# Patient Record
Sex: FEMALE | Race: WHITE | NOT HISPANIC OR LATINO | Employment: FULL TIME | URBAN - METROPOLITAN AREA
[De-identification: names, ages, dates, MRNs, and addresses within clinical notes are randomized per-mention and may not be internally consistent; named-entity substitution may affect disease eponyms.]

---

## 2021-08-06 ENCOUNTER — THERAPY VISIT (OUTPATIENT)
Dept: PHYSICAL THERAPY | Facility: CLINIC | Age: 61
End: 2021-08-06
Payer: COMMERCIAL

## 2021-08-06 DIAGNOSIS — Z98.890 S/P ROTATOR CUFF SURGERY: ICD-10-CM

## 2021-08-06 DIAGNOSIS — G89.18 ACUTE POSTOPERATIVE PAIN OF RIGHT SHOULDER: Primary | ICD-10-CM

## 2021-08-06 DIAGNOSIS — M25.511 ACUTE POSTOPERATIVE PAIN OF RIGHT SHOULDER: Primary | ICD-10-CM

## 2021-08-06 PROCEDURE — 97110 THERAPEUTIC EXERCISES: CPT | Mod: GP | Performed by: PHYSICAL THERAPIST

## 2021-08-06 PROCEDURE — 97161 PT EVAL LOW COMPLEX 20 MIN: CPT | Mod: GP | Performed by: PHYSICAL THERAPIST

## 2021-08-09 NOTE — PROGRESS NOTES
"Physical Therapy Initial Evaluation  Subjective:  The history is provided by the patient. No  was used.   Therapist Generated HPI Evaluation  Problem details: \"Jazmin\" is a 62 yo female referred to physical therapy post op R RCR ( supra and subscap), SAD, extensive debridement, and sub pec biceps tenodesis under the direction of Dr. Nelson 7/19/2021.     Patient is R hand dominant.     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     She is fairly sore post op yet. Manages pain with medication and ice .    She is wearing full sling immobilizer with abductor cushion x 4 weeks, then 2 additional with sling only.     .         Type of problem:  Right shoulder.    This is a new condition.  Condition occurred with:  Other (post op ).  Where condition occurred: for unknown reasons.  Patient reports pain:  In the joint, anterior, lateral, posterior and upper arm.  Pain is described as aching (5/10 ) and is intermittent.  Pain radiates to:  Upper arm. Pain is the same all the time.  Since onset symptoms are gradually improving.  Associated symptoms:  Loss of motion/stiffness and loss of strength. Exacerbated by: limited to table top only movement, no greater than coffee cup lift.   and relieved by ice and other.  Special tests included:  X-ray and MRI.    Restrictions due to condition include:  Currently not working due to present treatment.  Barriers include:  None as reported by patient.                        Objective:  Standing Alignment:    Cervical/Thoracic:  Forward head  Shoulder/UE:  Rounded shoulders              Gait:    Gait Type:  Normal   Weight Bearing Status:  WBAT   Assistive Devices:  None      Flexibility/Screens:   Negative screens: Cervical           Neurological: She is alert. She has normal strength and normal reflexes. No cranial nerve deficit or sensory " deficit.                      Shoulder Evaluation:  ROM:    PROM:    Flexion:  Left:  170    Right: 40      Abduction:  Right:  30    Internal Rotation:  Left:  90    Right:  30  External Rotation:  Left:  90    Right:  0, able to reach neutral , did not proceed due to post op precautions due to subscap repair                     Strength:  not assessed                      Stability Testing:  not assessed      Special Tests:  not assessed      Palpation:  Palpation assessed shoulder: incisions are clean and really healing well.                                          General     ROS    Assessment/Plan:    Patient is a 61 year old female with right side shoulder complaints.    Patient has the following significant findings with corresponding treatment plan.                Diagnosis 1:   R RCR ( supra and subscap), SAD, extensive debridement, and sub pec biceps tenodesis under the direction of Dr. Nelson 7/19/2021.         Therapy Evaluation Codes:   1) History comprised of:   Personal factors that impact the plan of care:      None.    Comorbidity factors that impact the plan of care are:      Cancer, Diabetes, High blood pressure and Overweight.     Medications impacting care: High blood pressure, Pain and allergy to sulfa meds .  2) Examination of Body Systems comprised of:   Body structures and functions that impact the plan of care:      Shoulder.   Activity limitations that impact the plan of care are:      Cooking, Driving, Dressing, Lifting, Working, Sleeping and Laying down.  3) Clinical presentation characteristics are:   Stable/Uncomplicated.  4) Decision-Making    Low complexity using standardized patient assessment instrument and/or measureable assessment of functional outcome.  Cumulative Therapy Evaluation is: Low complexity.    Previous and current functional limitations:  (See Goal Flow Sheet for this information)    Short term and Long term goals: (See Goal Flow Sheet for this information)      Communication ability:  Patient appears to be able to clearly communicate and understand verbal and written communication and follow directions correctly.  Treatment Explanation - The following has been discussed with the patient:   RX ordered/plan of care  Anticipated outcomes  Possible risks and side effects  This patient would benefit from PT intervention to resume normal activities.   Rehab potential is good.    Frequency:  1 X week, once daily  Duration:  for 6 weeks, then 1x/week , every other week x 12 weeks   Discharge Plan:  Independent in home treatment program.  Reach maximal therapeutic benefit.    Rehabilitation Plans: SAOS post op RC Repair with appropriate precautions based on surgery, OK to begin early PROM by MD order.     Please refer to the daily flowsheet for treatment today, total treatment time and time spent performing 1:1 timed codes.

## 2021-08-11 ENCOUNTER — THERAPY VISIT (OUTPATIENT)
Dept: PHYSICAL THERAPY | Facility: CLINIC | Age: 61
End: 2021-08-11
Payer: COMMERCIAL

## 2021-08-11 DIAGNOSIS — G89.18 ACUTE POSTOPERATIVE PAIN OF RIGHT SHOULDER: Primary | ICD-10-CM

## 2021-08-11 DIAGNOSIS — M25.511 ACUTE POSTOPERATIVE PAIN OF RIGHT SHOULDER: Primary | ICD-10-CM

## 2021-08-11 PROCEDURE — 97110 THERAPEUTIC EXERCISES: CPT | Mod: GP | Performed by: PHYSICAL THERAPIST

## 2021-08-11 PROCEDURE — 97014 ELECTRIC STIMULATION THERAPY: CPT | Performed by: PHYSICAL THERAPIST

## 2021-08-13 ENCOUNTER — THERAPY VISIT (OUTPATIENT)
Dept: PHYSICAL THERAPY | Facility: CLINIC | Age: 61
End: 2021-08-13
Payer: COMMERCIAL

## 2021-08-13 DIAGNOSIS — G89.18 ACUTE POSTOPERATIVE PAIN OF RIGHT SHOULDER: Primary | ICD-10-CM

## 2021-08-13 DIAGNOSIS — Z98.890 S/P ROTATOR CUFF SURGERY: ICD-10-CM

## 2021-08-13 DIAGNOSIS — M25.511 ACUTE POSTOPERATIVE PAIN OF RIGHT SHOULDER: Primary | ICD-10-CM

## 2021-08-13 PROCEDURE — 97110 THERAPEUTIC EXERCISES: CPT | Mod: GP

## 2021-08-16 ENCOUNTER — THERAPY VISIT (OUTPATIENT)
Dept: PHYSICAL THERAPY | Facility: CLINIC | Age: 61
End: 2021-08-16
Payer: COMMERCIAL

## 2021-08-16 DIAGNOSIS — Z98.890 S/P ROTATOR CUFF SURGERY: Primary | ICD-10-CM

## 2021-08-16 PROCEDURE — 97010 HOT OR COLD PACKS THERAPY: CPT | Mod: GP | Performed by: PHYSICAL THERAPIST

## 2021-08-16 PROCEDURE — 97110 THERAPEUTIC EXERCISES: CPT | Mod: GP | Performed by: PHYSICAL THERAPIST

## 2021-08-18 ENCOUNTER — THERAPY VISIT (OUTPATIENT)
Dept: PHYSICAL THERAPY | Facility: CLINIC | Age: 61
End: 2021-08-18
Payer: COMMERCIAL

## 2021-08-18 DIAGNOSIS — Z98.890 S/P ROTATOR CUFF SURGERY: Primary | ICD-10-CM

## 2021-08-18 PROCEDURE — 97010 HOT OR COLD PACKS THERAPY: CPT | Mod: GP | Performed by: PHYSICAL THERAPIST

## 2021-08-18 PROCEDURE — 97110 THERAPEUTIC EXERCISES: CPT | Mod: GP | Performed by: PHYSICAL THERAPIST

## 2021-08-24 ENCOUNTER — THERAPY VISIT (OUTPATIENT)
Dept: PHYSICAL THERAPY | Facility: CLINIC | Age: 61
End: 2021-08-24
Payer: COMMERCIAL

## 2021-08-24 DIAGNOSIS — Z98.890 S/P ROTATOR CUFF SURGERY: ICD-10-CM

## 2021-08-24 DIAGNOSIS — M25.511 RIGHT SHOULDER PAIN: Primary | ICD-10-CM

## 2021-08-24 PROCEDURE — 97110 THERAPEUTIC EXERCISES: CPT | Mod: GP | Performed by: PHYSICAL THERAPIST

## 2021-08-26 ENCOUNTER — THERAPY VISIT (OUTPATIENT)
Dept: PHYSICAL THERAPY | Facility: CLINIC | Age: 61
End: 2021-08-26
Payer: COMMERCIAL

## 2021-08-26 DIAGNOSIS — M25.511 ACUTE PAIN OF RIGHT SHOULDER: Primary | ICD-10-CM

## 2021-08-26 DIAGNOSIS — Z98.890 S/P ROTATOR CUFF SURGERY: ICD-10-CM

## 2021-08-26 PROCEDURE — 97110 THERAPEUTIC EXERCISES: CPT | Mod: GP | Performed by: PHYSICAL THERAPIST

## 2021-08-30 ENCOUNTER — THERAPY VISIT (OUTPATIENT)
Dept: PHYSICAL THERAPY | Facility: CLINIC | Age: 61
End: 2021-08-30
Payer: COMMERCIAL

## 2021-08-30 DIAGNOSIS — Z98.890 S/P ROTATOR CUFF SURGERY: Primary | ICD-10-CM

## 2021-08-30 PROCEDURE — 97010 HOT OR COLD PACKS THERAPY: CPT | Mod: GP | Performed by: PHYSICAL THERAPIST

## 2021-08-30 PROCEDURE — 97110 THERAPEUTIC EXERCISES: CPT | Mod: GP | Performed by: PHYSICAL THERAPIST

## 2021-09-08 ENCOUNTER — THERAPY VISIT (OUTPATIENT)
Dept: PHYSICAL THERAPY | Facility: CLINIC | Age: 61
End: 2021-09-08
Payer: COMMERCIAL

## 2021-09-08 DIAGNOSIS — Z98.890 S/P ROTATOR CUFF SURGERY: ICD-10-CM

## 2021-09-08 DIAGNOSIS — M25.511 ACUTE PAIN OF RIGHT SHOULDER: Primary | ICD-10-CM

## 2021-09-08 PROCEDURE — 97110 THERAPEUTIC EXERCISES: CPT | Mod: GP | Performed by: PHYSICAL THERAPIST

## 2021-09-13 ENCOUNTER — THERAPY VISIT (OUTPATIENT)
Dept: PHYSICAL THERAPY | Facility: CLINIC | Age: 61
End: 2021-09-13
Payer: COMMERCIAL

## 2021-09-13 DIAGNOSIS — M25.511 ACUTE PAIN OF RIGHT SHOULDER: ICD-10-CM

## 2021-09-13 DIAGNOSIS — Z98.890 S/P ROTATOR CUFF SURGERY: Primary | ICD-10-CM

## 2021-09-13 PROCEDURE — 97110 THERAPEUTIC EXERCISES: CPT | Mod: GP | Performed by: PHYSICAL THERAPIST

## 2021-09-16 ENCOUNTER — THERAPY VISIT (OUTPATIENT)
Dept: PHYSICAL THERAPY | Facility: CLINIC | Age: 61
End: 2021-09-16
Payer: COMMERCIAL

## 2021-09-16 DIAGNOSIS — M25.511 ACUTE PAIN OF RIGHT SHOULDER: ICD-10-CM

## 2021-09-16 DIAGNOSIS — Z98.890 S/P ROTATOR CUFF SURGERY: Primary | ICD-10-CM

## 2021-09-16 PROCEDURE — 97110 THERAPEUTIC EXERCISES: CPT | Mod: GP | Performed by: PHYSICAL THERAPIST

## 2021-09-20 ENCOUNTER — THERAPY VISIT (OUTPATIENT)
Dept: PHYSICAL THERAPY | Facility: CLINIC | Age: 61
End: 2021-09-20
Payer: COMMERCIAL

## 2021-09-20 DIAGNOSIS — M25.511 RIGHT SHOULDER PAIN: ICD-10-CM

## 2021-09-20 DIAGNOSIS — Z98.890 S/P ROTATOR CUFF SURGERY: Primary | ICD-10-CM

## 2021-09-20 PROCEDURE — 97110 THERAPEUTIC EXERCISES: CPT | Mod: GP | Performed by: PHYSICAL THERAPIST

## 2021-09-23 ENCOUNTER — THERAPY VISIT (OUTPATIENT)
Dept: PHYSICAL THERAPY | Facility: CLINIC | Age: 61
End: 2021-09-23
Payer: COMMERCIAL

## 2021-09-23 DIAGNOSIS — M25.511 RIGHT SHOULDER PAIN: ICD-10-CM

## 2021-09-23 DIAGNOSIS — Z98.890 S/P ROTATOR CUFF SURGERY: Primary | ICD-10-CM

## 2021-09-23 PROCEDURE — 97110 THERAPEUTIC EXERCISES: CPT | Mod: GP | Performed by: PHYSICAL THERAPIST

## 2021-09-27 ENCOUNTER — THERAPY VISIT (OUTPATIENT)
Dept: PHYSICAL THERAPY | Facility: CLINIC | Age: 61
End: 2021-09-27
Payer: COMMERCIAL

## 2021-09-27 DIAGNOSIS — M25.511 RIGHT SHOULDER PAIN: ICD-10-CM

## 2021-09-27 DIAGNOSIS — Z98.890 S/P ROTATOR CUFF SURGERY: Primary | ICD-10-CM

## 2021-09-27 PROCEDURE — 97110 THERAPEUTIC EXERCISES: CPT | Mod: GP | Performed by: PHYSICAL THERAPIST

## 2021-09-30 ENCOUNTER — THERAPY VISIT (OUTPATIENT)
Dept: PHYSICAL THERAPY | Facility: CLINIC | Age: 61
End: 2021-09-30
Payer: COMMERCIAL

## 2021-09-30 DIAGNOSIS — Z98.890 S/P ROTATOR CUFF SURGERY: Primary | ICD-10-CM

## 2021-09-30 DIAGNOSIS — M25.511 RIGHT SHOULDER PAIN: ICD-10-CM

## 2021-09-30 PROCEDURE — 97110 THERAPEUTIC EXERCISES: CPT | Mod: GP | Performed by: PHYSICAL THERAPIST

## 2021-10-12 ENCOUNTER — THERAPY VISIT (OUTPATIENT)
Dept: PHYSICAL THERAPY | Facility: CLINIC | Age: 61
End: 2021-10-12
Payer: COMMERCIAL

## 2021-10-12 DIAGNOSIS — Z98.890 S/P ROTATOR CUFF SURGERY: Primary | ICD-10-CM

## 2021-10-12 DIAGNOSIS — M25.511 RIGHT SHOULDER PAIN: ICD-10-CM

## 2021-10-12 PROCEDURE — 97110 THERAPEUTIC EXERCISES: CPT | Mod: GP | Performed by: PHYSICAL THERAPIST

## 2021-10-12 NOTE — PROGRESS NOTES
"Subjective:      Physical Exam                    Objective:  System    Physical Exam    General     ROS    Assessment/Plan:    PROGRESS  REPORT    Progress reporting period is from 8/2/2021 to 10/11/2021. 12 weeks post op R shoudler Subscap Repair, SAD    SUBJECTIVE  Jazmin feels gradually improved each month. Last week, due to soreness, she had been advised to a few days off the rehab visit and HEP has really helped reduce shoudler pain and achiness. Intrermittant \"catching\" R shoulder .        Current Pain level: 1/10   Initial Pain level: 7/10     Changes in function: Yes, see goal flow sheet for change in function   Adverse reactions: None;   ,         OBJECTIVE   12 weeks post op R RCR.     AROM 130-135 , ER 45, IR 85      HEP: gentle ROM daily, supine AROM strength 3x/week.     Gradually gaining 5-10 degrees/week as planned with goal of full ROM 4 months post op..     PT planned weekly for the next 3-4 weeks, then recommend every other week at that point unless directed by MD aj.     ASSESSMENT/PLAN  Updated problem list and treatment plan: Diagnosis 1:  R RCR, Subscap         STG/LTGs have been met or progress has been made towards goals:  Yes (See Goal flow sheet completed today.)  Assessment of Progress: The patient's condition is improving.  The patient's condition has potential to improve.  Self Management Plans:  Patient has been instructed in a home treatment program.  I have re-evaluated this patient and find that the nature, scope, duration and intensity of the therapy is appropriate for the medical condition of the patient.    Recommendations:  This patient would benefit from continued therapy.     Frequency:  1 X week, once daily  Duration:  for 4 weeks tapering to 1 X a week, every other week  over 8 weeks        Please refer to the daily flowsheet for treatment today, total treatment time and time spent performing 1:1 timed codes.    "

## 2021-10-19 ENCOUNTER — THERAPY VISIT (OUTPATIENT)
Dept: PHYSICAL THERAPY | Facility: CLINIC | Age: 61
End: 2021-10-19
Payer: COMMERCIAL

## 2021-10-19 DIAGNOSIS — Z98.890 S/P ROTATOR CUFF SURGERY: Primary | ICD-10-CM

## 2021-10-19 DIAGNOSIS — M25.511 RIGHT SHOULDER PAIN: ICD-10-CM

## 2021-10-19 PROCEDURE — 97110 THERAPEUTIC EXERCISES: CPT | Mod: GP | Performed by: PHYSICAL THERAPIST

## 2021-10-26 ENCOUNTER — THERAPY VISIT (OUTPATIENT)
Dept: PHYSICAL THERAPY | Facility: CLINIC | Age: 61
End: 2021-10-26
Payer: COMMERCIAL

## 2021-10-26 DIAGNOSIS — Z98.890 S/P ROTATOR CUFF SURGERY: Primary | ICD-10-CM

## 2021-10-26 DIAGNOSIS — M25.511 RIGHT SHOULDER PAIN: ICD-10-CM

## 2021-10-26 PROCEDURE — 97110 THERAPEUTIC EXERCISES: CPT | Mod: GP | Performed by: PHYSICAL THERAPIST

## 2021-11-02 ENCOUNTER — THERAPY VISIT (OUTPATIENT)
Dept: PHYSICAL THERAPY | Facility: CLINIC | Age: 61
End: 2021-11-02
Payer: COMMERCIAL

## 2021-11-02 DIAGNOSIS — Z98.890 S/P ROTATOR CUFF SURGERY: Primary | ICD-10-CM

## 2021-11-02 DIAGNOSIS — M25.511 RIGHT SHOULDER PAIN: ICD-10-CM

## 2021-11-02 PROCEDURE — 97110 THERAPEUTIC EXERCISES: CPT | Mod: GP | Performed by: PHYSICAL THERAPIST

## 2021-11-09 ENCOUNTER — THERAPY VISIT (OUTPATIENT)
Dept: PHYSICAL THERAPY | Facility: CLINIC | Age: 61
End: 2021-11-09
Payer: COMMERCIAL

## 2021-11-09 DIAGNOSIS — M25.511 RIGHT SHOULDER PAIN: ICD-10-CM

## 2021-11-09 DIAGNOSIS — Z98.890 S/P ROTATOR CUFF SURGERY: Primary | ICD-10-CM

## 2021-11-09 PROCEDURE — 97110 THERAPEUTIC EXERCISES: CPT | Mod: GP | Performed by: PHYSICAL THERAPIST

## 2021-11-09 PROCEDURE — 97112 NEUROMUSCULAR REEDUCATION: CPT | Mod: GP | Performed by: PHYSICAL THERAPIST

## 2021-11-16 ENCOUNTER — THERAPY VISIT (OUTPATIENT)
Dept: PHYSICAL THERAPY | Facility: CLINIC | Age: 61
End: 2021-11-16
Payer: COMMERCIAL

## 2021-11-16 DIAGNOSIS — Z98.890 S/P ROTATOR CUFF SURGERY: Primary | ICD-10-CM

## 2021-11-16 DIAGNOSIS — M25.511 RIGHT SHOULDER PAIN: ICD-10-CM

## 2021-11-16 PROCEDURE — 97140 MANUAL THERAPY 1/> REGIONS: CPT | Mod: GP | Performed by: PHYSICAL THERAPIST

## 2021-11-16 PROCEDURE — 97110 THERAPEUTIC EXERCISES: CPT | Mod: GP | Performed by: PHYSICAL THERAPIST

## 2021-11-22 ENCOUNTER — THERAPY VISIT (OUTPATIENT)
Dept: PHYSICAL THERAPY | Facility: CLINIC | Age: 61
End: 2021-11-22
Payer: COMMERCIAL

## 2021-11-22 DIAGNOSIS — Z98.890 S/P ROTATOR CUFF SURGERY: Primary | ICD-10-CM

## 2021-11-22 DIAGNOSIS — M25.511 RIGHT SHOULDER PAIN: ICD-10-CM

## 2021-11-22 PROCEDURE — 97110 THERAPEUTIC EXERCISES: CPT | Mod: GP | Performed by: PHYSICAL THERAPIST

## 2021-11-22 PROCEDURE — 97112 NEUROMUSCULAR REEDUCATION: CPT | Mod: GP | Performed by: PHYSICAL THERAPIST

## 2021-11-30 ENCOUNTER — THERAPY VISIT (OUTPATIENT)
Dept: PHYSICAL THERAPY | Facility: CLINIC | Age: 61
End: 2021-11-30
Payer: COMMERCIAL

## 2021-11-30 DIAGNOSIS — Z98.890 S/P ROTATOR CUFF SURGERY: Primary | ICD-10-CM

## 2021-11-30 DIAGNOSIS — M25.511 RIGHT SHOULDER PAIN: ICD-10-CM

## 2021-11-30 PROCEDURE — 97110 THERAPEUTIC EXERCISES: CPT | Mod: GP | Performed by: PHYSICAL THERAPIST

## 2021-11-30 PROCEDURE — 97112 NEUROMUSCULAR REEDUCATION: CPT | Mod: GP | Performed by: PHYSICAL THERAPIST

## 2021-12-09 ENCOUNTER — THERAPY VISIT (OUTPATIENT)
Dept: PHYSICAL THERAPY | Facility: CLINIC | Age: 61
End: 2021-12-09
Payer: COMMERCIAL

## 2021-12-09 DIAGNOSIS — G89.18 ACUTE POSTOPERATIVE PAIN OF RIGHT SHOULDER: ICD-10-CM

## 2021-12-09 DIAGNOSIS — M25.511 ACUTE POSTOPERATIVE PAIN OF RIGHT SHOULDER: ICD-10-CM

## 2021-12-09 DIAGNOSIS — Z98.890 S/P ROTATOR CUFF SURGERY: Primary | ICD-10-CM

## 2021-12-09 PROCEDURE — 97110 THERAPEUTIC EXERCISES: CPT | Mod: GP | Performed by: PHYSICAL THERAPIST

## 2021-12-09 PROCEDURE — 97140 MANUAL THERAPY 1/> REGIONS: CPT | Mod: GP | Performed by: PHYSICAL THERAPIST

## 2021-12-14 ENCOUNTER — THERAPY VISIT (OUTPATIENT)
Dept: PHYSICAL THERAPY | Facility: CLINIC | Age: 61
End: 2021-12-14
Payer: COMMERCIAL

## 2021-12-14 DIAGNOSIS — M25.511 ACUTE POSTOPERATIVE PAIN OF RIGHT SHOULDER: ICD-10-CM

## 2021-12-14 DIAGNOSIS — G89.18 ACUTE POSTOPERATIVE PAIN OF RIGHT SHOULDER: ICD-10-CM

## 2021-12-14 DIAGNOSIS — Z98.890 S/P ROTATOR CUFF SURGERY: Primary | ICD-10-CM

## 2021-12-14 PROCEDURE — 97140 MANUAL THERAPY 1/> REGIONS: CPT | Mod: GP | Performed by: PHYSICAL THERAPIST

## 2021-12-14 PROCEDURE — 97110 THERAPEUTIC EXERCISES: CPT | Mod: GP | Performed by: PHYSICAL THERAPIST

## 2021-12-14 NOTE — PROGRESS NOTES
Subjective:  HPI  Physical Exam                    Objective:  System    Physical Exam    General     ROS    Assessment/Plan:    PROGRESS  REPORT      SUBJECTIVE  Patient is improving post op ROM, pain, and function.     She is meeting up with her MD next week for consult and potential RTW conversation.      Plan to transition to self care at that point.      SPADI 27/100.     Patient notes of 60-70% recovered compared to the oposite side.     She notes that she is ready to RTW at a light duty capacity.      Most functional limits are lifting greater than 10#, reaching terminally overhead and behind her back.       Current Pain level: 1/10   Initial Pain level: 7/10   Changes in function: Yes, see goal flow sheet for change in function   Adverse reactions: None;   ,         OBJECTIVE  R shoulder ROM      ER 70 vs 80   IR 90 vs 90    Flexion 160 vs 170   posterior reach L5. Vs T10    Plan strength and function test next visit along with PN to MD to assist in RTW decision making.       ASSESSMENT/PLAN  Updated problem list and treatment plan: Diagnosis 1:  Post op R RCR , subscap       STG/LTGs have been met or progress has been made towards goals:  Yes (See Goal flow sheet completed today.)  Assessment of Progress: The patient's condition is improving.  The patient's condition has potential to improve.  Self Management Plans:  Patient has been instructed in a home treatment program.      Recommendations:  This patient would benefit from further evaluation.    1 additional visit prior to next MD consult.     Please refer to the daily flowsheet for treatment today, total treatment time and time spent performing 1:1 timed codes.

## 2021-12-21 ENCOUNTER — THERAPY VISIT (OUTPATIENT)
Dept: PHYSICAL THERAPY | Facility: CLINIC | Age: 61
End: 2021-12-21
Payer: COMMERCIAL

## 2021-12-21 DIAGNOSIS — M25.511 ACUTE POSTOPERATIVE PAIN OF RIGHT SHOULDER: ICD-10-CM

## 2021-12-21 DIAGNOSIS — M25.511 ACUTE PAIN OF RIGHT SHOULDER: ICD-10-CM

## 2021-12-21 DIAGNOSIS — G89.18 ACUTE POSTOPERATIVE PAIN OF RIGHT SHOULDER: ICD-10-CM

## 2021-12-21 DIAGNOSIS — Z98.890 S/P ROTATOR CUFF SURGERY: Primary | ICD-10-CM

## 2021-12-21 PROCEDURE — 97110 THERAPEUTIC EXERCISES: CPT | Mod: GP | Performed by: PHYSICAL THERAPIST

## 2021-12-21 PROCEDURE — 97530 THERAPEUTIC ACTIVITIES: CPT | Mod: GP | Performed by: PHYSICAL THERAPIST

## 2021-12-21 PROCEDURE — 97112 NEUROMUSCULAR REEDUCATION: CPT | Mod: GP | Performed by: PHYSICAL THERAPIST

## 2021-12-21 NOTE — PROGRESS NOTES
Subjective:  The history is provided by the patient.     Physical Exam                    Objective:  System    Physical Exam    General     ROS    Assessment/Plan:    PROGRESS  REPORT    Progress reporting period is from 8/6/2021 to 12/21/2021.     SUBJECTIVE   Jazmin is 5 months post op massive  R RCR .    Gradually gaining strength, mobility and function. Pain is lessening with time as well.      SPADI 20/100.     Patient is scheduled RTW with limitations based on MD consult today.        Current Pain level: 1/10   Initial Pain level: 7/10     OBJECTIVE   R Shoudler ROM   flexion 140  vs 170   ER 60  vs 85     IR 90 vs 90   posterior reach T12 vs T7.     Global RTC endurance strength is roughly 40% of the opposite side.     Functional lifting recommendation   floor to waist 10#    waist to shoulder 5-8#    shoulder or above 0-1#    outward reach  0-2#    (Based on 50% 1 RM lift test)    HEP well established with daily gentle ROM. Strengthening 2-3x/week with light weight, high reps, RTC and scap stability          Recommedn PT follow up 2-4 weeks for 2-4 visits.       ASSESSMENT/PLAN  Updated problem list and treatment plan: Diagnosis 1:  Post op R Massive RCR (subscap)       STG/LTGs have been met or progress has been made towards goals:  Yes (See Goal flow sheet completed today.)  Assessment of Progress: The patient's condition is improving.  The patient's condition has potential to improve.  Self Management Plans:  Patient has been instructed in a home treatment program.    Recommendations:  This patient would benefit from continued therapy.     Frequency:  1 X a month, once daily  Duration:  for 3 months    Follow up with MD today for consult.       Please refer to the daily flowsheet for treatment today, total treatment time and time spent performing 1:1 timed codes.

## 2021-12-21 NOTE — LETTER
[facility]  [address]  [place] [address]  [phone]    2021    Re: Jazmin Vergara   :   1960  MRN:  [id number]   REFERRING PHYSICIAN:   Kinga L[facility]    Date of Initial Evaluation:  21  Visits:  Rxs Used:   Reason for Referral:     S/P rotator cuff surgery  Acute postoperative pain of right shoulder  Acute pain of right shoulder    PROGRESS  REPORT  Progress reporting period is from 2021 to 2021.     SUBJECTIVE  Jazmin is 5 months post op massive  R RCR .  Gradually gaining strength, mobility and function. Pain is lessening with time as well.   SPADI 20/100.  Patient is scheduled RTW with limitations based on MD consult today.     Current Pain level: 1/10   Initial Pain level: 7/10     OBJECTIVE   R Shoudler ROM   flexion 140  vs 170   ER 60  vs 85     IR 90 vs 90   posterior reach T12 vs T7.   Global RTC endurance strength is roughly 40% of the opposite side.  Functional lifting recommendation  floor to waist 10#    waist to shoulder 5-8#    shoulder or above 0-1#   outward reach  0-2#  (Based on 50% 1 RM lift test)    HEP well established with daily gentle ROM. Strengthening 2-3x/week with light weight, high reps, RTC and scap stability     Recommedn PT follow up 2-4 weeks for 2-4 visits.       ASSESSMENT/PLAN  Updated problem list and treatment plan: Diagnosis 1:  Post op R Massive RCR (subscap)     STG/LTGs have been met or progress has been made towards goals:  Yes (See Goal flow sheet completed today.)  Assessment of Progress: The patient's condition is improving.  The patient's condition has potential to improve.  Self Management Plans:  Patient has been instructed in a home treatment program.    Recommendations:  This patient would benefit from continued therapy.     Frequency:  1 X a month, once daily  Duration:  for 3 months    Follow up with MD today for  consult.     Thank you for your referral.    INQUIRIES  Therapist: Nicholas Figueroa, PT, ATC, Cert. MDT  [facility] [place] [facility]  [address]  [place] [address]  Phone: [phone]  Fax: [phone]

## 2021-12-25 ENCOUNTER — HEALTH MAINTENANCE LETTER (OUTPATIENT)
Age: 61
End: 2021-12-25

## 2022-01-13 ENCOUNTER — THERAPY VISIT (OUTPATIENT)
Dept: PHYSICAL THERAPY | Facility: CLINIC | Age: 62
End: 2022-01-13
Payer: COMMERCIAL

## 2022-01-13 DIAGNOSIS — M25.511 ACUTE POSTOPERATIVE PAIN OF RIGHT SHOULDER: ICD-10-CM

## 2022-01-13 DIAGNOSIS — M25.511 ACUTE PAIN OF RIGHT SHOULDER: ICD-10-CM

## 2022-01-13 DIAGNOSIS — Z98.890 S/P ROTATOR CUFF SURGERY: Primary | ICD-10-CM

## 2022-01-13 DIAGNOSIS — G89.18 ACUTE POSTOPERATIVE PAIN OF RIGHT SHOULDER: ICD-10-CM

## 2022-01-13 PROCEDURE — 97110 THERAPEUTIC EXERCISES: CPT | Mod: GP | Performed by: PHYSICAL THERAPIST

## 2022-01-24 NOTE — PROGRESS NOTES
Subjective:  HPI  Physical Exam                    Objective:  System    Physical Exam    General     ROS    Assessment/Plan:    DISCHARGE REPORT    Progress reporting period is from 8/6/2021 to 1/13/2022.     SUBJECTIVE   Jazmin returns to PT after consult with MD roughly 3 weeks ago.     She has been directed back to work with restriction. She is gradually advancing function and happy with outcome to this point.     5 1/2 months post op.     Jazmin feels that she is able to self manage and seems to be improving function, ROM and strength with return to work duty.   She does have limitations with lifting greater than 20#, reaching behind her back yet that most likely continue to restore function, ROM and strength up to 1 year post op.      Current Pain level: 0-1/10, intermittent    Initial Pain level: 7/10   Changes in function: Yes, see goal flow sheet for change in function   Adverse reactions: None;   ,         OBJECTIVE   Functional Testing 1 repetition maximum      15# floor to waist    waist to shoulder 5#   nothing overhead .     ROM : 80 ER, 90 IR , 150  flexion       No painful arc. Good scap control with reaching.     Overall shoulder strength restored to roughly 60-70% of the opposite     ASSESSMENT/PLAN  Updated problem list and treatment plan: Diagnosis 1:  S/p R RCR       STG/LTGs have been met or progress has been made towards goals:  Yes (See Goal flow sheet completed today.)  Assessment of Progress: The patient's condition is improving.  The patient's condition has potential to improve.  Self Management Plans:  Patient has been instructed in a home treatment program.      Recommendations:  This patient is ready to be discharged from therapy and continue their home treatment program.  This patient would benefit from further evaluation.    Please refer to the daily flowsheet for treatment today, total treatment time and time spent performing 1:1 timed codes.

## 2022-02-11 ENCOUNTER — THERAPY VISIT (OUTPATIENT)
Dept: PHYSICAL THERAPY | Facility: CLINIC | Age: 62
End: 2022-02-11
Payer: COMMERCIAL

## 2022-02-11 DIAGNOSIS — M25.511 ACUTE POSTOPERATIVE PAIN OF RIGHT SHOULDER: ICD-10-CM

## 2022-02-11 DIAGNOSIS — Z98.890 S/P ROTATOR CUFF SURGERY: Primary | ICD-10-CM

## 2022-02-11 DIAGNOSIS — G89.18 ACUTE POSTOPERATIVE PAIN OF RIGHT SHOULDER: ICD-10-CM

## 2022-02-11 PROCEDURE — 97110 THERAPEUTIC EXERCISES: CPT | Mod: GP | Performed by: PHYSICAL THERAPIST

## 2022-03-04 ENCOUNTER — THERAPY VISIT (OUTPATIENT)
Dept: PHYSICAL THERAPY | Facility: CLINIC | Age: 62
End: 2022-03-04
Payer: COMMERCIAL

## 2022-03-04 DIAGNOSIS — M25.511 ACUTE PAIN OF RIGHT SHOULDER: ICD-10-CM

## 2022-03-04 DIAGNOSIS — G89.18 ACUTE POSTOPERATIVE PAIN OF RIGHT SHOULDER: ICD-10-CM

## 2022-03-04 DIAGNOSIS — M25.511 ACUTE POSTOPERATIVE PAIN OF RIGHT SHOULDER: ICD-10-CM

## 2022-03-04 DIAGNOSIS — Z98.890 S/P ROTATOR CUFF SURGERY: Primary | ICD-10-CM

## 2022-03-04 PROCEDURE — 97530 THERAPEUTIC ACTIVITIES: CPT | Mod: GP | Performed by: PHYSICAL THERAPIST

## 2022-03-04 PROCEDURE — 97110 THERAPEUTIC EXERCISES: CPT | Mod: GP | Performed by: PHYSICAL THERAPIST

## 2022-03-04 NOTE — PROGRESS NOTES
Subjective:  HPI  Physical Exam                    Objective:  System    Physical Exam    General     ROS    Assessment/Plan:    PROGRESS  REPORT    Progress reporting period is from 2/11/2022 to 3/4/2022.     SUBJECTIVE   Jazmin is 7 1/2  months post op R Massive RCR  She is working FT at [employer].    PT follow up on a monthly basis for the past 2 months      Intermittant crepitation yet    . Gradually advancing function,strength and mobility.     SPADI 10/100         Current Pain level: 1/10   Initial Pain level: 7/10        ;   ,         OBJECTIVE   AROM 165-170, IR 90, ER 70-80 R shoudler. Posterior reach T10.     MMT soreness with abduction yet.     Global R endurance strength restored to 80% . ROM 90%, function to 90% to the opposite side.    Follow up with MD within 1 month.     1 additional PT appointment is scheduled  after her MD , consider DC at that point, unless directed by MD different.          ASSESSMENT/PLAN  Updated problem list and treatment plan: Diagnosis 1:   Post op Massive R RCR     STG/LTGs have been met or progress has been made towards goals:  Yes (See Goal flow sheet completed today.)  Assessment of Progress: The patient's condition is improving.  The patient's condition has potential to improve.  Self Management Plans:  Patient has been instructed in a home treatment program.    Recommendations:  This patient is ready to be discharged from therapy and continue their home treatment program.  This patient would benefit from further evaluation.    Please refer to the daily flowsheet for treatment today, total treatment time and time spent performing 1:1 timed codes.

## 2022-08-12 ENCOUNTER — TRANSCRIBE ORDERS (OUTPATIENT)
Dept: OTHER | Age: 62
End: 2022-08-12

## 2022-08-12 DIAGNOSIS — M17.12 PRIMARY OSTEOARTHRITIS OF LEFT KNEE: Primary | ICD-10-CM

## 2022-08-19 ENCOUNTER — THERAPY VISIT (OUTPATIENT)
Dept: PHYSICAL THERAPY | Facility: CLINIC | Age: 62
End: 2022-08-19
Payer: COMMERCIAL

## 2022-08-19 DIAGNOSIS — M25.562 LEFT KNEE PAIN: Primary | ICD-10-CM

## 2022-08-19 PROCEDURE — 97110 THERAPEUTIC EXERCISES: CPT | Mod: GP | Performed by: PHYSICAL THERAPIST

## 2022-08-19 PROCEDURE — 97161 PT EVAL LOW COMPLEX 20 MIN: CPT | Mod: GP | Performed by: PHYSICAL THERAPIST

## 2022-08-19 ASSESSMENT — ACTIVITIES OF DAILY LIVING (ADL)
RAW_SCORE: 32
STIFFNESS: THE SYMPTOM AFFECTS MY ACTIVITY MODERATELY
GO UP STAIRS: ACTIVITY IS VERY DIFFICULT
PAIN: THE SYMPTOM AFFECTS MY ACTIVITY SLIGHTLY
WEAKNESS: THE SYMPTOM AFFECTS MY ACTIVITY MODERATELY
HOW_WOULD_YOU_RATE_THE_OVERALL_FUNCTION_OF_YOUR_KNEE_DURING_YOUR_USUAL_DAILY_ACTIVITIES?: ABNORMAL
KNEE_ACTIVITY_OF_DAILY_LIVING_SUM: 32
HOW_WOULD_YOU_RATE_THE_CURRENT_FUNCTION_OF_YOUR_KNEE_DURING_YOUR_USUAL_DAILY_ACTIVITIES_ON_A_SCALE_FROM_0_TO_100_WITH_100_BEING_YOUR_LEVEL_OF_KNEE_FUNCTION_PRIOR_TO_YOUR_INJURY_AND_0_BEING_THE_INABILITY_TO_PERFORM_ANY_OF_YOUR_USUAL_DAILY_ACTIVITIES?: 50
KNEEL ON THE FRONT OF YOUR KNEE: I AM UNABLE TO DO THE ACTIVITY
SQUAT: I AM UNABLE TO DO THE ACTIVITY
KNEE_ACTIVITY_OF_DAILY_LIVING_SCORE: 45.71
AS_A_RESULT_OF_YOUR_KNEE_INJURY,_HOW_WOULD_YOU_RATE_YOUR_CURRENT_LEVEL_OF_DAILY_ACTIVITY?: ABNORMAL
SWELLING: I DO NOT HAVE THE SYMPTOM
STAND: ACTIVITY IS SOMEWHAT DIFFICULT
WALK: ACTIVITY IS SOMEWHAT DIFFICULT
SIT WITH YOUR KNEE BENT: ACTIVITY IS MINIMALLY DIFFICULT
GIVING WAY, BUCKLING OR SHIFTING OF KNEE: THE SYMPTOM AFFECTS MY ACTIVITY MODERATELY
RISE FROM A CHAIR: ACTIVITY IS MINIMALLY DIFFICULT
GO DOWN STAIRS: ACTIVITY IS VERY DIFFICULT
LIMPING: THE SYMPTOM AFFECTS MY ACTIVITY MODERATELY

## 2022-08-19 NOTE — PROGRESS NOTES
Physical Therapy Initial Examination/Evaluation  August 19, 2022    Jazmin Vergara is a 62 year old female referred to physical therapy by Dr Manas Nelson MD for treatment of left knee pain    Diagnosis: left knee pain    Precautions/Restrictions/MD instructions/Other pertinent hx E&T    Therapist Impression:   Jazmin presents to therapy with chronic left knee pain following a fall. Demonstrates significant lack of range of motion, decreased quadriceps and hip strength, poor mechanics, and gait impairments. Skilled therapy is required to address ongoing limitations, make progress towards therapy goals, improve quality of life, and return to previous level of function.     GOALS: walking, squatting     NEXT: hip strengthening, quad strengthening, weight bearing practice      Subjective:  DOI/onset: 8/12/2022 DOS: NA  Acute Injury or Gradual Onset?: Gradual injury over time  Mechanism of Injury: Fall   Related PMH: multiple falls on knee Previous Treatment: Ice and injection Effect of prior treatment: poor  Imaging: MRI  Chief Complaint/Functional Limitations:   Walking, squatting, weight bearing, straightening leg  and see below in therapy evaluation codes   Pain: rest 4 /10, activity 9/10 Location: left knee Frequency: Constant Described as: aching and sharp Alleviated by: Nothing Progression of Symptoms: Gradually getting worse. Time of day when pain is worse: Activity related and Position related  Occupation: [employer]  Job duties: prolonged standing, prolonged walking  Current HEP/exercise regimen: walking 25,000 steps/day  Patient's goals are see chief complaints difficulty walking, difficulty straightening leg, unable to squat    Other pertinent PMH/Red Flags: Cancer, Diabetes, High Blood Pressure, Overweight   Barriers at home/work: None as reported by patient  Pertinent Surgical History: NA  Medications: Thyroid, Pain and High blood pressure  General health as reported by patient: good  Return to MD:   PRN    KNEE EVALUATION    Static Posture  Decreased WB on L LE     Dynamic Movement Screen  Single leg stance observations: unable to fully WB on L LE   Double limb squat observations: weight shift right, ~20% WB on L LE during squat   Single limb squat observations: Not assessed  Gait: Lateral trunk lean, Knee flexion at initial contact and throughout left stance phase and Pain    Range of Motion  Hip Joint Screen: WNL      Knee ROM Extension Flexion   Left 10 110   Right 0 140      Flexibility Left Right   Quadriceps severe trivial   Hamstrings moderate mild   Ankle none/WNL none/WNL   Figure 4 NA NA     Hip and Knee Strength   MMT Left Right   Hip Abduction 4-/5 5/5   Hip Extension 4/5 5/5   Hip ER 4/5 4+/5          Knee MMT Quadriceps set Straight Leg Raise   Left Poor Unable   Right Good Good     Knee ligaments and meniscus: Not assessed    Patellofemoral assessment: difficult to assess due to guarding    Palpation  Left: joint line tenderness, pain over patellar tendon    Assessment/Plan:  Patient is a 62 year old female with left side knee complaints.    Patient has the following significant findings with corresponding treatment plan.                Diagnosis 1:  Left Knee  Pain -  hot/cold therapy, electric stimulation, manual therapy, splint/taping/bracing/orthotics, education and home program  Decreased ROM/flexibility - manual therapy and therapeutic exercise  Decreased joint mobility - manual therapy and therapeutic exercise  Decreased strength - therapeutic exercise and therapeutic activities  Impaired balance - neuro re-education and therapeutic activities  Impaired gait - gait training  Decreased function - therapeutic activities    Therapy Evaluation Codes:     Cumulative Therapy Evaluation is: Low complexity.    Previous and current functional limitations:  (See Goal Flow Sheet for this information)    Short term and Long term goals: (See Goal Flow Sheet for this information)     Communication  ability:  Patient appears to be able to clearly communicate and understand verbal and written communication and follow directions correctly.  Treatment Explanation - The following has been discussed with the patient:   RX ordered/plan of care  Anticipated outcomes  Possible risks and side effects  This patient would benefit from PT intervention to resume normal activities.   Rehab potential is good.    Frequency:  1 X week, once daily  Duration:  for 4 weeks tapering to 2 X a month over 8 weeks  Discharge Plan:  Achieve all LTG.  Independent in home treatment program.  Reach maximal therapeutic benefit.    Please refer to the daily flowsheet for treatment today, total treatment time and time spent performing 1:1 timed codes.     Please refer to the daily flowsheet for treatment today, total treatment time and time spent performing 1:1 timed codes.

## 2022-08-26 ENCOUNTER — THERAPY VISIT (OUTPATIENT)
Dept: PHYSICAL THERAPY | Facility: CLINIC | Age: 62
End: 2022-08-26
Payer: COMMERCIAL

## 2022-08-26 DIAGNOSIS — M25.562 LEFT KNEE PAIN: Primary | ICD-10-CM

## 2022-08-26 PROCEDURE — 97530 THERAPEUTIC ACTIVITIES: CPT | Mod: GP | Performed by: PHYSICAL THERAPIST

## 2022-08-26 PROCEDURE — 97140 MANUAL THERAPY 1/> REGIONS: CPT | Mod: GP | Performed by: PHYSICAL THERAPIST

## 2022-09-01 ENCOUNTER — THERAPY VISIT (OUTPATIENT)
Dept: PHYSICAL THERAPY | Facility: CLINIC | Age: 62
End: 2022-09-01
Payer: COMMERCIAL

## 2022-09-01 DIAGNOSIS — M25.562 LEFT KNEE PAIN: Primary | ICD-10-CM

## 2022-09-01 PROCEDURE — 97112 NEUROMUSCULAR REEDUCATION: CPT | Mod: GP | Performed by: PHYSICAL THERAPIST

## 2022-09-01 PROCEDURE — 97110 THERAPEUTIC EXERCISES: CPT | Mod: GP | Performed by: PHYSICAL THERAPIST

## 2022-09-09 ENCOUNTER — THERAPY VISIT (OUTPATIENT)
Dept: PHYSICAL THERAPY | Facility: CLINIC | Age: 62
End: 2022-09-09
Payer: COMMERCIAL

## 2022-09-09 DIAGNOSIS — M25.562 LEFT KNEE PAIN: Primary | ICD-10-CM

## 2022-09-09 PROCEDURE — 97140 MANUAL THERAPY 1/> REGIONS: CPT | Mod: GP | Performed by: PHYSICAL THERAPIST

## 2022-09-09 PROCEDURE — 97110 THERAPEUTIC EXERCISES: CPT | Mod: GP | Performed by: PHYSICAL THERAPIST

## 2022-09-16 ENCOUNTER — THERAPY VISIT (OUTPATIENT)
Dept: PHYSICAL THERAPY | Facility: CLINIC | Age: 62
End: 2022-09-16
Payer: COMMERCIAL

## 2022-09-16 DIAGNOSIS — M25.562 LEFT KNEE PAIN: Primary | ICD-10-CM

## 2022-09-16 PROCEDURE — 97140 MANUAL THERAPY 1/> REGIONS: CPT | Mod: GP | Performed by: PHYSICAL THERAPIST

## 2022-09-16 PROCEDURE — 97110 THERAPEUTIC EXERCISES: CPT | Mod: GP | Performed by: PHYSICAL THERAPIST

## 2022-09-30 ENCOUNTER — THERAPY VISIT (OUTPATIENT)
Dept: PHYSICAL THERAPY | Facility: CLINIC | Age: 62
End: 2022-09-30
Payer: COMMERCIAL

## 2022-09-30 DIAGNOSIS — M25.562 LEFT KNEE PAIN: Primary | ICD-10-CM

## 2022-09-30 PROCEDURE — 97110 THERAPEUTIC EXERCISES: CPT | Mod: GP | Performed by: PHYSICAL THERAPIST

## 2022-09-30 PROCEDURE — 97140 MANUAL THERAPY 1/> REGIONS: CPT | Mod: GP | Performed by: PHYSICAL THERAPIST

## 2022-10-16 ENCOUNTER — HEALTH MAINTENANCE LETTER (OUTPATIENT)
Age: 62
End: 2022-10-16

## 2023-04-01 ENCOUNTER — HEALTH MAINTENANCE LETTER (OUTPATIENT)
Age: 63
End: 2023-04-01

## 2024-06-01 ENCOUNTER — HEALTH MAINTENANCE LETTER (OUTPATIENT)
Age: 64
End: 2024-06-01

## 2024-12-22 ENCOUNTER — HEALTH MAINTENANCE LETTER (OUTPATIENT)
Age: 64
End: 2024-12-22

## 2025-06-14 ENCOUNTER — HEALTH MAINTENANCE LETTER (OUTPATIENT)
Age: 65
End: 2025-06-14